# Patient Record
Sex: MALE
[De-identification: names, ages, dates, MRNs, and addresses within clinical notes are randomized per-mention and may not be internally consistent; named-entity substitution may affect disease eponyms.]

---

## 2023-04-01 ENCOUNTER — NURSE TRIAGE (OUTPATIENT)
Dept: OTHER | Facility: CLINIC | Age: 48
End: 2023-04-01

## 2023-04-01 NOTE — TELEPHONE ENCOUNTER
Location of patient: [place]    Current Symptoms: Pt has an insect bite on his right wrist. He reports a nickel sized area of swelling and redness. He would like to get checked out in the ED. Associated Symptoms: NA    Pain Severity: Severe pain    Temperature: Denies fever    What has been tried: Cleansed area with soap and water    Recommended disposition: See PCP within 24 Hours    Care advice provided, patient verbalizes understanding; denies any other questions or concerns. Outcome: Patient/caller agrees to proceed to [facility] Emergency Department    This triage is a result of a call to the [address] [place] [address]    Reason for Disposition   [1] Red or very tender (to touch) area AND [2] started over 24 hours after the bite    Protocols used:  Insect Bite-ADULT-AH